# Patient Record
Sex: MALE | Race: OTHER | HISPANIC OR LATINO | URBAN - METROPOLITAN AREA
[De-identification: names, ages, dates, MRNs, and addresses within clinical notes are randomized per-mention and may not be internally consistent; named-entity substitution may affect disease eponyms.]

---

## 2024-02-26 ENCOUNTER — EMERGENCY (EMERGENCY)
Facility: HOSPITAL | Age: 29
LOS: 0 days | Discharge: HOME | End: 2024-02-26
Attending: STUDENT IN AN ORGANIZED HEALTH CARE EDUCATION/TRAINING PROGRAM
Payer: COMMERCIAL

## 2024-02-26 VITALS
WEIGHT: 145.06 LBS | RESPIRATION RATE: 18 BRPM | HEART RATE: 67 BPM | OXYGEN SATURATION: 98 % | SYSTOLIC BLOOD PRESSURE: 129 MMHG | HEIGHT: 70 IN | TEMPERATURE: 98 F | DIASTOLIC BLOOD PRESSURE: 63 MMHG

## 2024-02-26 DIAGNOSIS — K08.89 OTHER SPECIFIED DISORDERS OF TEETH AND SUPPORTING STRUCTURES: ICD-10-CM

## 2024-02-26 PROCEDURE — 99283 EMERGENCY DEPT VISIT LOW MDM: CPT

## 2024-02-26 PROCEDURE — 99284 EMERGENCY DEPT VISIT MOD MDM: CPT

## 2024-02-26 RX ORDER — IBUPROFEN 200 MG
600 TABLET ORAL ONCE
Refills: 0 | Status: COMPLETED | OUTPATIENT
Start: 2024-02-26 | End: 2024-02-26

## 2024-02-26 RX ADMIN — Medication 600 MILLIGRAM(S): at 12:43

## 2024-02-26 NOTE — ED PROVIDER NOTE - PATIENT PORTAL LINK FT
You can access the FollowMyHealth Patient Portal offered by [facility] by registering at the following website: [email or web address]. By joining [employer]’s FollowMyHealth portal, you will also be able to view your health information using other applications (apps) compatible with our system.

## 2024-02-26 NOTE — ED PROVIDER NOTE - CLINICAL SUMMARY MEDICAL DECISION MAKING FREE TEXT BOX
28-year-old male, no past medical history, presenting with left lower dental pain for 1 month, worse in the last 2 days, no alleviating or aggravating factors, no associated symptoms.  Patient saw dentist last week and is scheduled for an extraction at the end of March.  He is currently on amoxicillin and is on day 3.  Denies fevers, chills, nausea, vomiting, discharge or bleeding, trauma.  Cracked tooth 17 with dental carry.  No surrounding edema or abscess.  No active discharge.  Airway patent and tolerating secretions.  Medications given and effects reassessed.  Discussed return precautions and follow-up outpatient.  Will discharge with dental clinic.  Patient comfortable with plan.

## 2024-02-26 NOTE — ED PROVIDER NOTE - PHYSICAL EXAMINATION
Constitutional: Well developed, well nourished, no acute distress  Head: Normocephalic, Atraumatic  Eyes: PERRLA, EOMI, conjunctiva and sclera WNL  ENT: Moist mucous membranes, no rhinorrhea,  left lower back molar broken with dental carry no abscess, left lower jaw edema   Neck: Supple, Nontender,  No acute cervical adenopathy  Respiratory: Normal chest excursion with respiration; Breath sounds clear and equal B/L; No wheezes, rales, or rhonchi   Cardiovascular: RRR; Normal S1, S2; No murmurs, rubs or gallops   ABD/GI: Nondistended; Nontender; No guarding, rigidity or rebound   EXT/MS: Moving all extremities; Distal pulses 2+ B/L; No peripheral edema  Skin: Normal for age and race; Warm and dry; No rash  Neurologic: AAO x 4;  Normal motor and sensory function  Psychiatric: Appropriate affect, normal mood

## 2024-02-26 NOTE — ED PROVIDER NOTE - OBJECTIVE STATEMENT
28-year-old male with no segment past medical history presenting with 2 days of worsening dental pain.  Patient reports left lower back molar pain.  Patient initially presented to dentist a week ago and has scheduled extraction planned for March 29.  Patient reports pain has been worsening and is unable to wait that long.  Patient is is on the third day of amoxicillin course.  Patient endorsing headache.  No fever, dizziness, headedness, chest pain, shortness of breath, abdominal diarrhea, constipation,  symptoms.  Patient has no other complaints at this time.